# Patient Record
Sex: FEMALE | URBAN - METROPOLITAN AREA
[De-identification: names, ages, dates, MRNs, and addresses within clinical notes are randomized per-mention and may not be internally consistent; named-entity substitution may affect disease eponyms.]

---

## 2024-11-14 ENCOUNTER — APPOINTMENT (RX ONLY)
Dept: URBAN - METROPOLITAN AREA CLINIC 144 | Facility: CLINIC | Age: 64
Setting detail: DERMATOLOGY
End: 2024-11-14

## 2024-11-14 DIAGNOSIS — Z41.9 ENCOUNTER FOR PROCEDURE FOR PURPOSES OTHER THAN REMEDYING HEALTH STATE, UNSPECIFIED: ICD-10-CM

## 2024-11-14 PROCEDURE — ? COSMETIC CONSULTATION: FILLERS

## 2024-11-14 PROCEDURE — ? BOTOX

## 2024-11-14 NOTE — PROCEDURE: BOTOX
Detail Level: Zone
Show Inventory Tab: Show
Map Statement: Please see attached map for locations and injection amounts.\\nGl 8,7,8\\nMed brow 2,2\\nFh L18,16\\nCf L18,16\\nDao R16,12\\nLower lip corner right 1\\nTo 1,1 \\nTotal 126\\nSeminar price.
Consent: Written consent obtained. Risks include but not limited to lid/brow ptosis, bruising, swelling, diplopia, temporary effect, incomplete chemical denervation.
Post-Care Instructions: Patient instructed to not lie down for 4 hours and limit physical activity for 24 hours.
Total Units: 0

## 2024-12-05 ENCOUNTER — APPOINTMENT (OUTPATIENT)
Dept: URBAN - METROPOLITAN AREA CLINIC 144 | Facility: CLINIC | Age: 64
Setting detail: DERMATOLOGY
End: 2024-12-05

## 2024-12-05 DIAGNOSIS — Z41.9 ENCOUNTER FOR PROCEDURE FOR PURPOSES OTHER THAN REMEDYING HEALTH STATE, UNSPECIFIED: ICD-10-CM

## 2024-12-05 PROCEDURE — ? COSMETIC FOLLOW-UP

## 2024-12-05 PROCEDURE — ? COSMETIC CONSULTATION: FILLERS

## 2024-12-05 NOTE — PROCEDURE: COSMETIC FOLLOW-UP
Detail Level: Zone
Comments (Free Text): Gl 8,7,8\\nMed brow 2,2\\nFh L18,16\\nCf L18,16\\nDao R16,12\\nLower lip corner right 1\\nTo 1,1 \\nTotal 126\\nSeminar price.
Patient Satisfaction: Very pleased

## 2024-12-13 ENCOUNTER — APPOINTMENT (OUTPATIENT)
Dept: URBAN - METROPOLITAN AREA CLINIC 146 | Facility: CLINIC | Age: 64
Setting detail: DERMATOLOGY
End: 2024-12-13

## 2024-12-13 DIAGNOSIS — Z41.9 ENCOUNTER FOR PROCEDURE FOR PURPOSES OTHER THAN REMEDYING HEALTH STATE, UNSPECIFIED: ICD-10-CM

## 2024-12-13 PROCEDURE — ? FILLERS

## 2024-12-13 NOTE — PROCEDURE: FILLERS
Marionette Lines Filler Volume In Cc: 0
Include Cannula Information In Note?: No
Filler: Sculptra
Anesthesia Volume In Cc: 0.5
Anesthesia Type: 1% lidocaine with epinephrine
Number Of Syringes (Required For Inventory): 1
Inventory Information: This plan will send filler information to inventory based on the fillers you select. Multiple fillers can be sent but you must ensure you select the appropriate fillers in the inventory tab.
Detail Level: Detailed
Additional Anesthesia Volume In Cc: 6
Consent: Written consent obtained. Risks include but not limited to bruising, beading, irregular texture, ulceration, infection, allergic reaction, scar formation, incomplete augmentation, temporary nature, procedural pain.
Topical Anesthesia?: 12% lidocaine, 12% tetracaine
Post-Care Instructions: Patient instructed to apply ice to reduce swelling. Apply Dermaka cream for bruising. Patient may cover at bed time.
Number Of Syringes (Required For Inventory): 3
Filler: Radiesse+
Map Statment: See Attach Map for Complete Details
Filler: Restylane Contour
Filler: Redensity 2
Show Inventory Tab: Show

## 2024-12-20 ENCOUNTER — APPOINTMENT (OUTPATIENT)
Dept: URBAN - METROPOLITAN AREA CLINIC 146 | Facility: CLINIC | Age: 64
Setting detail: DERMATOLOGY
End: 2024-12-20

## 2024-12-20 DIAGNOSIS — Z41.9 ENCOUNTER FOR PROCEDURE FOR PURPOSES OTHER THAN REMEDYING HEALTH STATE, UNSPECIFIED: ICD-10-CM

## 2024-12-20 PROCEDURE — ? BOTOX

## 2024-12-20 PROCEDURE — ? COSMETIC FOLLOW-UP

## 2024-12-20 NOTE — PROCEDURE: COSMETIC FOLLOW-UP
Comments (Free Text): 1 Radiesse+ Syringe Documented\\n1 Redensity 2 Syringe Documented\\n1 Restylane Contour Syringe Documented\\n3 Sculptra Syringes Documented\\n\\nFiller: Restylane Contour\\nSite(s): \\nDepth of Injection: subdermal plane\\n\\nAdditional Filler: Radiesse+\\nSite(s): \\nDepth of Injection: subdermal plane\\n\\nAdditional Filler: Sculptra\\nSite(s): \\nDepth of Injection: subdermal plane\\n\\nAdditional Filler: Redensity 2
Detail Level: Zone

## 2024-12-20 NOTE — PROCEDURE: BOTOX
Total Units: 0
Detail Level: Zone
Map Statement: Please see attached map for locations and injection amounts.\\nLeft lower lip 2
Consent: Written consent obtained. Risks include but not limited to lid/brow ptosis, bruising, swelling, diplopia, temporary effect, incomplete chemical denervation.
Show Inventory Tab: Show
Post-Care Instructions: Patient instructed to not lie down for 4 hours and limit physical activity for 24 hours.

## 2025-03-18 ENCOUNTER — APPOINTMENT (OUTPATIENT)
Dept: URBAN - METROPOLITAN AREA CLINIC 146 | Facility: CLINIC | Age: 65
Setting detail: DERMATOLOGY
End: 2025-03-18

## 2025-03-18 DIAGNOSIS — Z41.9 ENCOUNTER FOR PROCEDURE FOR PURPOSES OTHER THAN REMEDYING HEALTH STATE, UNSPECIFIED: ICD-10-CM

## 2025-03-18 PROCEDURE — ? COSMETIC CONSULTATION: GENERAL

## 2025-03-18 PROCEDURE — ? COSMETIC CONSULTATION: SOFWAVE

## 2025-03-18 PROCEDURE — ? BOTOX

## 2025-03-18 NOTE — PROCEDURE: BOTOX
Detail Level: Zone
Show Inventory Tab: Show
Map Statement: Please see attached map for locations and injection amounts.\\nGl 8,7,8\\nMed brow 2,2\\nFh L18,16\\nCf L18,16\\nDao R16,12\\nR Lower lip corner 1\\nTo 1,1 \\nMid cutaneous upper lip 2\\nTotal 128\\nBotox Tuesday $12
Consent: Written consent obtained. Risks include but not limited to lid/brow ptosis, bruising, swelling, diplopia, temporary effect, incomplete chemical denervation.
Post-Care Instructions: Patient instructed to not lie down for 4 hours and limit physical activity for 24 hours.
Total Units: 0

## 2025-06-24 ENCOUNTER — APPOINTMENT (OUTPATIENT)
Dept: URBAN - METROPOLITAN AREA CLINIC 146 | Facility: CLINIC | Age: 65
Setting detail: DERMATOLOGY
End: 2025-06-24

## 2025-06-24 DIAGNOSIS — Z41.9 ENCOUNTER FOR PROCEDURE FOR PURPOSES OTHER THAN REMEDYING HEALTH STATE, UNSPECIFIED: ICD-10-CM

## 2025-06-24 PROCEDURE — ? BOTOX

## 2025-06-24 NOTE — PROCEDURE: BOTOX
Detail Level: Zone
Show Inventory Tab: Show
Map Statement: Please see attached map for locations and injection amounts.\\nGl 8,7,8\\nMed brow 2,2\\nFh R18,16\\nCf R18,16\\nDao R16,12\\nLower lip corner right 1\\nTt 1,1 \\nEyelid 2,2\\nTotal 130\\nSeminar price.\\n\\nError in last charting,  right side is hyper not left.
Consent: Written consent obtained. Risks include but not limited to lid/brow ptosis, bruising, swelling, diplopia, temporary effect, incomplete chemical denervation.
Post-Care Instructions: Patient instructed to not lie down for 4 hours and limit physical activity for 24 hours.
Total Units: 0